# Patient Record
Sex: FEMALE | Race: WHITE | NOT HISPANIC OR LATINO | URBAN - METROPOLITAN AREA
[De-identification: names, ages, dates, MRNs, and addresses within clinical notes are randomized per-mention and may not be internally consistent; named-entity substitution may affect disease eponyms.]

---

## 2019-07-14 ENCOUNTER — EMERGENCY (EMERGENCY)
Facility: HOSPITAL | Age: 84
LOS: 1 days | Discharge: HOME | End: 2019-07-14
Attending: EMERGENCY MEDICINE | Admitting: EMERGENCY MEDICINE
Payer: MEDICARE

## 2019-07-14 VITALS
SYSTOLIC BLOOD PRESSURE: 139 MMHG | OXYGEN SATURATION: 98 % | TEMPERATURE: 99 F | RESPIRATION RATE: 18 BRPM | DIASTOLIC BLOOD PRESSURE: 68 MMHG | HEART RATE: 82 BPM

## 2019-07-14 VITALS — DIASTOLIC BLOOD PRESSURE: 78 MMHG | SYSTOLIC BLOOD PRESSURE: 148 MMHG

## 2019-07-14 DIAGNOSIS — Z90.49 ACQUIRED ABSENCE OF OTHER SPECIFIED PARTS OF DIGESTIVE TRACT: Chronic | ICD-10-CM

## 2019-07-14 LAB
ALBUMIN SERPL ELPH-MCNC: 3.3 G/DL — SIGNIFICANT CHANGE UP (ref 3.3–5)
ALP SERPL-CCNC: 90 U/L — SIGNIFICANT CHANGE UP (ref 40–120)
ALT FLD-CCNC: 23 U/L — SIGNIFICANT CHANGE UP (ref 12–78)
ANION GAP SERPL CALC-SCNC: 7 MMOL/L — SIGNIFICANT CHANGE UP (ref 5–17)
APPEARANCE UR: ABNORMAL
APTT BLD: 25.6 SEC — LOW (ref 28.5–37)
AST SERPL-CCNC: 26 U/L — SIGNIFICANT CHANGE UP (ref 15–37)
BASOPHILS # BLD AUTO: 0.05 K/UL — SIGNIFICANT CHANGE UP (ref 0–0.2)
BASOPHILS NFR BLD AUTO: 0.4 % — SIGNIFICANT CHANGE UP (ref 0–2)
BILIRUB SERPL-MCNC: 0.7 MG/DL — SIGNIFICANT CHANGE UP (ref 0.2–1.2)
BILIRUB UR-MCNC: ABNORMAL
BUN SERPL-MCNC: 33 MG/DL — HIGH (ref 7–23)
CALCIUM SERPL-MCNC: 9.8 MG/DL — SIGNIFICANT CHANGE UP (ref 8.5–10.1)
CHLORIDE SERPL-SCNC: 105 MMOL/L — SIGNIFICANT CHANGE UP (ref 96–108)
CO2 SERPL-SCNC: 29 MMOL/L — SIGNIFICANT CHANGE UP (ref 22–31)
COLOR SPEC: YELLOW — SIGNIFICANT CHANGE UP
CREAT SERPL-MCNC: 1.8 MG/DL — HIGH (ref 0.5–1.3)
DIFF PNL FLD: ABNORMAL
EOSINOPHIL # BLD AUTO: 0.09 K/UL — SIGNIFICANT CHANGE UP (ref 0–0.5)
EOSINOPHIL NFR BLD AUTO: 0.8 % — SIGNIFICANT CHANGE UP (ref 0–6)
FLU A RESULT: SIGNIFICANT CHANGE UP
FLU A RESULT: SIGNIFICANT CHANGE UP
FLUAV AG NPH QL: SIGNIFICANT CHANGE UP
FLUBV AG NPH QL: SIGNIFICANT CHANGE UP
GLUCOSE SERPL-MCNC: 133 MG/DL — HIGH (ref 70–99)
GLUCOSE UR QL: NEGATIVE — SIGNIFICANT CHANGE UP
HCT VFR BLD CALC: 38.7 % — SIGNIFICANT CHANGE UP (ref 34.5–45)
HGB BLD-MCNC: 12.5 G/DL — SIGNIFICANT CHANGE UP (ref 11.5–15.5)
IMM GRANULOCYTES NFR BLD AUTO: 1.1 % — SIGNIFICANT CHANGE UP (ref 0–1.5)
INR BLD: 0.99 RATIO — SIGNIFICANT CHANGE UP (ref 0.88–1.16)
KETONES UR-MCNC: ABNORMAL
LEUKOCYTE ESTERASE UR-ACNC: ABNORMAL
LIDOCAIN IGE QN: 156 U/L — SIGNIFICANT CHANGE UP (ref 73–393)
LYMPHOCYTES # BLD AUTO: 0.72 K/UL — LOW (ref 1–3.3)
LYMPHOCYTES # BLD AUTO: 6.3 % — LOW (ref 13–44)
MCHC RBC-ENTMCNC: 29 PG — SIGNIFICANT CHANGE UP (ref 27–34)
MCHC RBC-ENTMCNC: 32.3 GM/DL — SIGNIFICANT CHANGE UP (ref 32–36)
MCV RBC AUTO: 89.8 FL — SIGNIFICANT CHANGE UP (ref 80–100)
MONOCYTES # BLD AUTO: 0.76 K/UL — SIGNIFICANT CHANGE UP (ref 0–0.9)
MONOCYTES NFR BLD AUTO: 6.7 % — SIGNIFICANT CHANGE UP (ref 2–14)
NEUTROPHILS # BLD AUTO: 9.62 K/UL — HIGH (ref 1.8–7.4)
NEUTROPHILS NFR BLD AUTO: 84.7 % — HIGH (ref 43–77)
NITRITE UR-MCNC: NEGATIVE — SIGNIFICANT CHANGE UP
NRBC # BLD: 0 /100 WBCS — SIGNIFICANT CHANGE UP (ref 0–0)
PH UR: 5 — SIGNIFICANT CHANGE UP (ref 5–8)
PLATELET # BLD AUTO: 218 K/UL — SIGNIFICANT CHANGE UP (ref 150–400)
POTASSIUM SERPL-MCNC: 4 MMOL/L — SIGNIFICANT CHANGE UP (ref 3.5–5.3)
POTASSIUM SERPL-SCNC: 4 MMOL/L — SIGNIFICANT CHANGE UP (ref 3.5–5.3)
PROT SERPL-MCNC: 7.2 G/DL — SIGNIFICANT CHANGE UP (ref 6–8.3)
PROT UR-MCNC: 25 MG/DL
PROTHROM AB SERPL-ACNC: 11.3 SEC — SIGNIFICANT CHANGE UP (ref 10–12.9)
RBC # BLD: 4.31 M/UL — SIGNIFICANT CHANGE UP (ref 3.8–5.2)
RBC # FLD: 13.5 % — SIGNIFICANT CHANGE UP (ref 10.3–14.5)
RSV RESULT: SIGNIFICANT CHANGE UP
RSV RNA RESP QL NAA+PROBE: SIGNIFICANT CHANGE UP
SODIUM SERPL-SCNC: 141 MMOL/L — SIGNIFICANT CHANGE UP (ref 135–145)
SP GR SPEC: 1.02 — SIGNIFICANT CHANGE UP (ref 1.01–1.02)
UROBILINOGEN FLD QL: NEGATIVE — SIGNIFICANT CHANGE UP
WBC # BLD: 11.37 K/UL — HIGH (ref 3.8–10.5)
WBC # FLD AUTO: 11.37 K/UL — HIGH (ref 3.8–10.5)

## 2019-07-14 PROCEDURE — 81001 URINALYSIS AUTO W/SCOPE: CPT

## 2019-07-14 PROCEDURE — 93005 ELECTROCARDIOGRAM TRACING: CPT

## 2019-07-14 PROCEDURE — 74176 CT ABD & PELVIS W/O CONTRAST: CPT

## 2019-07-14 PROCEDURE — 70450 CT HEAD/BRAIN W/O DYE: CPT | Mod: 26

## 2019-07-14 PROCEDURE — 83690 ASSAY OF LIPASE: CPT

## 2019-07-14 PROCEDURE — 87631 RESP VIRUS 3-5 TARGETS: CPT

## 2019-07-14 PROCEDURE — 80053 COMPREHEN METABOLIC PANEL: CPT

## 2019-07-14 PROCEDURE — 99284 EMERGENCY DEPT VISIT MOD MDM: CPT | Mod: 25

## 2019-07-14 PROCEDURE — 85610 PROTHROMBIN TIME: CPT

## 2019-07-14 PROCEDURE — 70450 CT HEAD/BRAIN W/O DYE: CPT

## 2019-07-14 PROCEDURE — 85027 COMPLETE CBC AUTOMATED: CPT

## 2019-07-14 PROCEDURE — 93010 ELECTROCARDIOGRAM REPORT: CPT

## 2019-07-14 PROCEDURE — 71045 X-RAY EXAM CHEST 1 VIEW: CPT | Mod: 26

## 2019-07-14 PROCEDURE — 99284 EMERGENCY DEPT VISIT MOD MDM: CPT

## 2019-07-14 PROCEDURE — 74176 CT ABD & PELVIS W/O CONTRAST: CPT | Mod: 26

## 2019-07-14 PROCEDURE — 82962 GLUCOSE BLOOD TEST: CPT

## 2019-07-14 PROCEDURE — 96374 THER/PROPH/DIAG INJ IV PUSH: CPT

## 2019-07-14 PROCEDURE — 36415 COLL VENOUS BLD VENIPUNCTURE: CPT

## 2019-07-14 PROCEDURE — 85730 THROMBOPLASTIN TIME PARTIAL: CPT

## 2019-07-14 PROCEDURE — 71045 X-RAY EXAM CHEST 1 VIEW: CPT

## 2019-07-14 RX ORDER — SODIUM CHLORIDE 9 MG/ML
1000 INJECTION INTRAMUSCULAR; INTRAVENOUS; SUBCUTANEOUS ONCE
Refills: 0 | Status: COMPLETED | OUTPATIENT
Start: 2019-07-14 | End: 2019-07-14

## 2019-07-14 RX ORDER — CEFUROXIME AXETIL 250 MG
1 TABLET ORAL
Qty: 20 | Refills: 0
Start: 2019-07-14 | End: 2019-07-23

## 2019-07-14 RX ORDER — CEFTRIAXONE 500 MG/1
1000 INJECTION, POWDER, FOR SOLUTION INTRAMUSCULAR; INTRAVENOUS ONCE
Refills: 0 | Status: COMPLETED | OUTPATIENT
Start: 2019-07-14 | End: 2019-07-14

## 2019-07-14 RX ORDER — ACETAMINOPHEN 500 MG
650 TABLET ORAL ONCE
Refills: 0 | Status: COMPLETED | OUTPATIENT
Start: 2019-07-14 | End: 2019-07-14

## 2019-07-14 RX ADMIN — Medication 650 MILLIGRAM(S): at 21:12

## 2019-07-14 RX ADMIN — SODIUM CHLORIDE 1000 MILLILITER(S): 9 INJECTION INTRAMUSCULAR; INTRAVENOUS; SUBCUTANEOUS at 20:28

## 2019-07-14 RX ADMIN — SODIUM CHLORIDE 1000 MILLILITER(S): 9 INJECTION INTRAMUSCULAR; INTRAVENOUS; SUBCUTANEOUS at 19:31

## 2019-07-14 RX ADMIN — CEFTRIAXONE 100 MILLIGRAM(S): 500 INJECTION, POWDER, FOR SOLUTION INTRAMUSCULAR; INTRAVENOUS at 21:13

## 2019-07-14 NOTE — ED PROVIDER NOTE - CARE PROVIDER_API CALL
Palak Reyes)  [facility] Neurosurgery  General  [address], [address]  [place] [address]  Phone: [phone]  Fax: [phone]  Follow Up Time:

## 2019-07-14 NOTE — ED ADULT TRIAGE NOTE - PRO INTERPRETER NEED 2
See Provation Note In Chart    Florencia Lewis MD  [facility]  Office Phone # [phone]       English

## 2019-07-14 NOTE — ED PROVIDER NOTE - OBJECTIVE STATEMENT
89 yo female hx of htn, cholecystectomy c/o sudden onset of lower abdominal pain with associated nausea/vomiting while at family's bridal party.  No diarrhea, no fever.  +chills.  States she felt fine before this event.  BIBEMS, given IV fluids, now not having any abdominal pain.  Nonradiating.  Initially moderate.  No medications taken prior to arrival.

## 2019-07-14 NOTE — ED PROVIDER NOTE - CARE PROVIDERS DIRECT ADDRESSES
[email or web address]

## 2019-07-14 NOTE — ED PROVIDER NOTE - NS ED ROS FT
Constitutional: - Fever, + Chills, - Anorexia, - Fatigue, - Night sweats  Eyes: - Discharge, - Irritation, - Redness, - Visual changes, - Light sensitivity, - Pain  EARS: - Ear Pain, - Tinnitus, - Decreased hearing  NOSE: - Congestion, - Bloody nose  MOUTH/THROAT: - Vocal Changes, - Drooling, - Sore throat  NECK: - Lumps, - Stiffness, - Pain  CV: - Palpitations, - Chest Pain, - Edema, - Syncope  RESP:  - Cough, - Shortness of Breath, - Dyspnea on Exertion, - Trouble speaking, - Pleuritic pain - Wheezing  GI: - Diarrhea, - Constipation, - Bloody stools, + Nausea, + Vomiting, +Abdominal Pain  : - Dysuria, -Frequency, - Hematuria, - Hesitancy, - Incontinence, - Saddle Anesthesia, - Abnormal discharge  MSK: - Myalgias, - Arthralgias, - Weakness, - Deformities, - Injuries  SKIN: - Color change, - Rash, - Swelling, - Ecchymosis, - Abrasion, - laceration  NEURO: - Change in behavior, - Dec. Alertness, - Headache, - Dizziness, - Change in speech, - Weakness, - Seizure-like activity, - Difficulty ambulating

## 2019-07-14 NOTE — ED ADULT NURSE NOTE - NSIMPLEMENTINTERV_GEN_ALL_ED
Implemented All Fall Risk Interventions:  [place] to call system. Call bell, personal items and telephone within reach. Instruct patient to call for assistance. Room bathroom lighting operational. Non-slip footwear when patient is off stretcher. Physically safe environment: no spills, clutter or unnecessary equipment. Stretcher in lowest position, wheels locked, appropriate side rails in place. Provide visual cue, wrist band, yellow gown, etc. Monitor gait and stability. Monitor for mental status changes and reorient to person, place, and time. Review medications for side effects contributing to fall risk. Reinforce activity limits and safety measures with patient and family.

## 2019-07-14 NOTE — ED PROVIDER NOTE - CARE PLAN
Principal Discharge DX:	Nausea and vomiting, intractability of vomiting not specified, unspecified vomiting type  Secondary Diagnosis:	Urinary tract infection without hematuria, site unspecified  Secondary Diagnosis:	Meningioma

## 2019-07-14 NOTE — ED PROVIDER NOTE - PROGRESS NOTE DETAILS
patient had large bowel movement patient feeling better, labs, ct reviwed, spoke with neurosurgery Dr. Reyes, ct head reviwed regading menigioma with mass effect, no emergent intervention needed at this time, can follow up as outpatient

## 2019-07-14 NOTE — ED ADULT NURSE NOTE - OBJECTIVE STATEMENT
Pt to ED via EMS c/o sharp abdominal pain while outside at a family gathering. Pt also reports constipation, afebrile, will continue to monitor

## 2019-07-15 RX ORDER — PROPRANOLOL HCL 160 MG
0 CAPSULE, EXTENDED RELEASE 24HR ORAL
Qty: 0 | Refills: 0 | DISCHARGE

## 2020-09-29 ENCOUNTER — TRANSCRIPTION ENCOUNTER (OUTPATIENT)
Age: 85
End: 2020-09-29

## 2021-06-17 NOTE — ED PROVIDER NOTE - PHYSICAL EXAMINATION
Requested Prescriptions     Signed Prescriptions Disp Refills    magnesium oxide (MAG-OX) 400 mg tablet 90 Tablet 3     Sig: TAKE 1 TABLET BY MOUTH EVERY DAY     Authorizing Provider: Jeannine Flores     Ordering User: Beny Hernandez Gen: Alert, NAD, +shivering  Head/eyes: NC/AT, PERRL, EOMI, normal lids/conjunctiva, no scleral icterus  ENT: airway patent  Neck: supple, no tenderness/meningismus/JVD, Trachea midline  Pulm/lung: Bilateral clear BS, normal resp effort, no wheeze/stridor/retractions  CV/heart: RRR, no M/R/G, +2 dist pulses (radial, pedal DP/PT, popliteal)  GI/Abd: soft, NT/ND, +BS, no guarding/rebound tenderness  Musculoskeletal: no edema/erythema/cyanosis, FROM in all extremities, no C/T/L spine ttp  Skin: no rash, no vesicles, no petechaie, no ecchymosis, no swelling  Neuro: AAOx3, CN 2-12 intact, normal sensation, 5/5 motor strength in all extremities, normal gait, no dysmetria

## 2024-09-09 NOTE — ED ADULT NURSE NOTE - CHIEF COMPLAINT QUOTE
Outpatient Surgery  Post-operative Instructions    Anesthesia  General precautions you should follow during the 24 hours after receiving any anesthesia (general, epidural, spinal, local with sedation, or nerve block):  Rest today but not necessarily in bed.  You may experience dizziness, drowsiness, or light-headedness. Do not drive a motor vehicle, drink alcohol, operate machinery, or make any major decisions for the next 24 hours.   During this time, a responsible adult needs to oversee your care for at least the next 6 hours.  Please continue to work of deep breathing exercised of 10 large breaths every few hours to assist with lung expansion    Diet  Wait until nausea resolves before beginning to drink and eat. Start with small sips of water.  You can resume clear liquids initially and start with a light, simple meal. Afterwards you can resume a normal diet.  A sore throat may persist for several day. Cold liquids along with ice chips and throat lozenges can help relieve the soreness.  Drink plenty of fluid for several days following surgery  To assist with constipation, eat fruits and vegetables. May take over-the-counter laxative, such as Miralax or Milk of Magnesia, as directed if constipated. I would also suggest a glass of prune juice daily.    Activity  No lifting > 15 lbs for 2 weeks from the date of surgery  No driving for 3 days after surgery. Can resume driving when off of all narcotic pain medications and pain will not limit your ability to react    Pain/Swelling  Take over the counter Tylenol and Ibuprofen for pain control  Tylenol max dosage of 500-1000mg every 6 hours  Ibuprofen max dosage of 600mg every 6 hours (alternate with Tylenol to allow every 3 hours dosing)  Utilize ice packs to the area of pain and swelling every hour as needed  This will decrease swelling and minimize pain  A narcotic may be prescribed such as Norco  Take as directed but usually 1 tab every 6 hours as needed for pain in  addition to what is described above    Wound  remove dressing or bandaids in 48 hours, leave steri strips in place  Can shower after dressings removed. If skin glue or steri strips in place, pat dry.   No bathing or swimming for 2 weeks    Home Medications  You can resume all home medications  Anticoagulate medications can be resumed 24 hours after surgery unless instructed otherwise    Call the office if any of the following  Excessive bleeding or drainage, but some bleeding and drainage is expected  Fever > 101.4  Increasing swelling or redness  Nausea and vomiting lasting for more than 24 hours  Trouble breathing or chest pain  Inability to urinate for more than 8 hours. Please proceed to the nearest urgent care center/emergency room for evaluation and possible vang catheter placement  IF YOUR ARE EXPERIENCING A MEDICAL EMERGENCY, PLEASE GO TO YOUR NEAREST EMERGENCY DEPARTMENT     Follow Up  Please make an appointment to see Dr Núñez in the office in approximately 2 weeks  [place] Office: [phone] [address]: [phone]   Post-Surgery Checklist    Follow up care is an important part of a successful surgery.  Your surgeon needs to see that you are healing and recovering well.  Please make sure to call and schedule your first follow-up visit with your surgeon.    Call your surgeon if you have any questions specific to your recovery.  Here is what you can do at home:    Pain Management.  Take your pain medication as directed, before the pain becomes too severe.  It may not work as well, if you wait too long between doses.  Pain medication may upset your stomach.  You can take a small amount of food to help with it.  Ask your healthcare provider of other ways to control pain, which may be heat, ice, or relaxation.    If your healthcare provider prescribes over the counter (OTC) medication, like acetaminophen, then ask how much you should take each day and verify that the medication will not  interact with any other prescribed medication.  You can overdose on these medications, especially in combination with other medications.  Drinking alcohol and taking pain medication can cause dizziness and slow your breathing.  It can even be deadly.  Do NOT drink alcohol while taking pain medication.  Constipation. Constipation is a common side effect of pain medicine.  Drinking plenty of fluids and eating fruits and vegetables high in fiber can also help.  Call your healthcare provider before taking any medications such as laxatives or stool softeners to ease the constipation.  Remember to call your surgeon prior to taking any laxatives.  Preventing blood clots and pneumonia.  Blood clots and pneumonia can still occur during the recovery period.  Follow the discharge instructions that you were given by your surgeon.  Your surgeon may want you to resume regular activity/exercises or wear compression stockings to prevent any blood clots.  Also, make sure to keep up with deep breathing and coughing exercises you learned in the hospital to prevent pneumonia.    Incision care. Follow instructions that you were given by your surgeon or in the hospital regarding when it's safe to shower.  Until then, keep the incision dry.  Watch for signs of infection, which include redness, swelling, drainage from incision, fever (100.4   F or higher), or as directed by your health care provider. (Please see section on Preventing Surgical Site Infections for more information)  Activity.  At your follow-up visit, ask your surgeon when it's safe to return to your regular activities.  Slowly get back to exercise and other activities.    Returning to work.  Going back to work depends on your surgery and your type of job.  You and your surgeon will decide when you can return to work.  Driving.  Do NOT drive until the surgeon tells you that you can.  Do NOT drive or operate any heavy machinery for 24 hours after anesthesia and while you are  still taking pain medication because it can make you react more slowly to things.  Do not make important decisions.  For the first 24 hours after anesthesia, do NOT make any important decisions, like signing legal documents.    Get good nutrition.  Healthy eating helps your body heal.  If you had a special diet before surgery, ask your surgeon if you should stay on the same diet.  Nausea. Some people may experience nausea and vomiting after surgery, which may be due to anesthesia, pain medication, or the stress of surgery.  Please remember to not push yourself to eat.  Your body will tell you when to eat and how much.  Follow these suggested tips to help manage nausea:  Start with clear liquids and soup  Advance to semi-solid foods like mashed potatoes, gelatin, and applesauce.    Slowly move to solid foods, but don't eat fatty, rich, or spicy foods at first.    You can try smaller meals more frequently.  Follow nutrition directions provided by surgeon.  If you have obstructive sleep apnea.  You were given anesthesia and pain medicine during surgery to keep you comfortable and your pain controlled.  After surgery, you may experience more apnea spells or longer apnea spells due to the medication that you were given.  At home:  Keep using the continuous positive airway pressure (CPAP) device when you sleep.  Unless your healthcare provider tells you not to, use it when you sleep, day or night.    Talk with your healthcare provider before taking any pain medicine, muscle relaxants, or sedatives.  Your provider will tell you about the possible dangers of taking these medications.      When to call your healthcare provider    Call your surgeon right away if you have any of the following:    Pain, swelling, and redness in your leg or arm  Increased pain or pain that is not relieved with medications  Feel too sleepy, dizzy, or groggy  Drainage, redness, bleeding, or swelling around the incision  Incision opens up (cover it  with a clean dressing or cloth)  Severe nausea or vomiting  Fever of 100.4   F (38  C) or higher  Skin changes, such as a rash, itching, or hives, which may mean an allergic reaction (if any facial swelling or trouble breathing, call 911 right away)      Call 911    Call 911 if you have the following symptoms:    Chest Pain  Trouble Breathing  Fast Heartbeat  Fainting  Heavy or Uncontrolled Bleeding    © 8477-6892 [employer]. [address] [place] [address]. All rights reserved. This information is not intended as a substitute for professional medical care. Always follow your healthcare professional's instructions.  © 3591-7191 [employer]. [address] [place] [address]. All rights reserved. This information is not intended as a substitute for professional medical care. Always follow your healthcare professional's instructions.          Want to Say “Thank You” to a Nurse?  The [employer] Award® was created in memory of VANE Montesinos by his family to say thank you to nurses who provide an outstanding level of care.    Submit a nomination using any method below.     OR    [email or web address].[email or web address]  Or visit the Resource section   on your [employer] linda       BIBA from [facility] c/o abd pain n/v